# Patient Record
Sex: FEMALE | Race: OTHER
[De-identification: names, ages, dates, MRNs, and addresses within clinical notes are randomized per-mention and may not be internally consistent; named-entity substitution may affect disease eponyms.]

---

## 2017-01-03 ENCOUNTER — HOSPITAL ENCOUNTER (OUTPATIENT)
Dept: HOSPITAL 47 - EC | Age: 47
Setting detail: OBSERVATION
LOS: 1 days | Discharge: HOME | End: 2017-01-04
Attending: HOSPITALIST | Admitting: HOSPITALIST
Payer: COMMERCIAL

## 2017-01-03 DIAGNOSIS — R07.89: Primary | ICD-10-CM

## 2017-01-03 DIAGNOSIS — R11.10: ICD-10-CM

## 2017-01-03 DIAGNOSIS — Z82.49: ICD-10-CM

## 2017-01-03 DIAGNOSIS — E87.6: ICD-10-CM

## 2017-01-03 DIAGNOSIS — Z80.0: ICD-10-CM

## 2017-01-03 DIAGNOSIS — I10: ICD-10-CM

## 2017-01-03 DIAGNOSIS — F41.9: ICD-10-CM

## 2017-01-03 DIAGNOSIS — R61: ICD-10-CM

## 2017-01-03 LAB
ALP SERPL-CCNC: 61 U/L (ref 38–126)
ALT SERPL-CCNC: 24 U/L (ref 9–52)
ANION GAP SERPL CALC-SCNC: 14 MMOL/L
APTT BLD: 22.5 SEC (ref 22–30)
AST SERPL-CCNC: 23 U/L (ref 14–36)
BASOPHILS # BLD AUTO: 0.1 K/UL (ref 0–0.2)
BASOPHILS NFR BLD AUTO: 1 %
BUN SERPL-SCNC: 11 MG/DL (ref 7–17)
CALCIUM SPEC-MCNC: 9.4 MG/DL (ref 8.4–10.2)
CH: 31.8
CHCM: 34.1
CHLORIDE SERPL-SCNC: 109 MMOL/L (ref 98–107)
CK SERPL-CCNC: 45 U/L (ref 30–135)
CK SERPL-CCNC: 46 U/L (ref 30–135)
CK SERPL-CCNC: 57 U/L (ref 30–135)
CO2 SERPL-SCNC: 17 MMOL/L (ref 22–30)
EOSINOPHIL # BLD AUTO: 0.1 K/UL (ref 0–0.7)
EOSINOPHIL NFR BLD AUTO: 1 %
ERYTHROCYTE [DISTWIDTH] IN BLOOD BY AUTOMATED COUNT: 4.51 M/UL (ref 3.8–5.4)
ERYTHROCYTE [DISTWIDTH] IN BLOOD: 13.1 % (ref 11.5–15.5)
GLUCOSE SERPL-MCNC: 115 MG/DL (ref 74–99)
HCT VFR BLD AUTO: 42.2 % (ref 34–46)
HDW: 2.39
HGB BLD-MCNC: 13.8 GM/DL (ref 11.4–16)
INR PPP: 1 (ref ?–1.1)
LUC NFR BLD AUTO: 2 %
LYMPHOCYTES # SPEC AUTO: 3.3 K/UL (ref 1–4.8)
LYMPHOCYTES NFR SPEC AUTO: 41 %
MAGNESIUM SPEC-SCNC: 1.8 MG/DL (ref 1.6–2.3)
MCH RBC QN AUTO: 30.7 PG (ref 25–35)
MCHC RBC AUTO-ENTMCNC: 32.8 G/DL (ref 31–37)
MCV RBC AUTO: 93.6 FL (ref 80–100)
MONOCYTES # BLD AUTO: 0.4 K/UL (ref 0–1)
MONOCYTES NFR BLD AUTO: 5 %
NEUTROPHILS # BLD AUTO: 4 K/UL (ref 1.3–7.7)
NEUTROPHILS NFR BLD AUTO: 50 %
NON-AFRICAN AMERICAN GFR(MDRD): >60
POTASSIUM SERPL-SCNC: 3.2 MMOL/L (ref 3.5–5.1)
PROT SERPL-MCNC: 7.3 G/DL (ref 6.3–8.2)
PT BLD: 9.8 SEC (ref 9–12)
SODIUM SERPL-SCNC: 140 MMOL/L (ref 137–145)
TROPONIN I SERPL-MCNC: <0.012 NG/ML (ref 0–0.03)
WBC # BLD AUTO: 0.19 10*3/UL
WBC # BLD AUTO: 8 K/UL (ref 3.8–10.6)
WBC (PEROX): 8.57

## 2017-01-03 PROCEDURE — 85610 PROTHROMBIN TIME: CPT

## 2017-01-03 PROCEDURE — 93005 ELECTROCARDIOGRAM TRACING: CPT

## 2017-01-03 PROCEDURE — 96366 THER/PROPH/DIAG IV INF ADDON: CPT

## 2017-01-03 PROCEDURE — 36415 COLL VENOUS BLD VENIPUNCTURE: CPT

## 2017-01-03 PROCEDURE — 83880 ASSAY OF NATRIURETIC PEPTIDE: CPT

## 2017-01-03 PROCEDURE — 93350 STRESS TTE ONLY: CPT

## 2017-01-03 PROCEDURE — 71020: CPT

## 2017-01-03 PROCEDURE — 83735 ASSAY OF MAGNESIUM: CPT

## 2017-01-03 PROCEDURE — 83690 ASSAY OF LIPASE: CPT

## 2017-01-03 PROCEDURE — 96365 THER/PROPH/DIAG IV INF INIT: CPT

## 2017-01-03 PROCEDURE — 93017 CV STRESS TEST TRACING ONLY: CPT

## 2017-01-03 PROCEDURE — 96375 TX/PRO/DX INJ NEW DRUG ADDON: CPT

## 2017-01-03 PROCEDURE — 84484 ASSAY OF TROPONIN QUANT: CPT

## 2017-01-03 PROCEDURE — 85379 FIBRIN DEGRADATION QUANT: CPT

## 2017-01-03 PROCEDURE — 96376 TX/PRO/DX INJ SAME DRUG ADON: CPT

## 2017-01-03 PROCEDURE — 85025 COMPLETE CBC W/AUTO DIFF WBC: CPT

## 2017-01-03 PROCEDURE — 80053 COMPREHEN METABOLIC PANEL: CPT

## 2017-01-03 PROCEDURE — 93306 TTE W/DOPPLER COMPLETE: CPT

## 2017-01-03 PROCEDURE — 82550 ASSAY OF CK (CPK): CPT

## 2017-01-03 PROCEDURE — 85730 THROMBOPLASTIN TIME PARTIAL: CPT

## 2017-01-03 PROCEDURE — 82553 CREATINE MB FRACTION: CPT

## 2017-01-03 PROCEDURE — 71275 CT ANGIOGRAPHY CHEST: CPT

## 2017-01-03 PROCEDURE — 80061 LIPID PANEL: CPT

## 2017-01-03 PROCEDURE — 99291 CRITICAL CARE FIRST HOUR: CPT

## 2017-01-03 RX ADMIN — NITROGLYCERIN PRN MG: 0.4 TABLET SUBLINGUAL at 11:03

## 2017-01-03 RX ADMIN — METOPROLOL TARTRATE SCH MG: 25 TABLET, FILM COATED ORAL at 17:49

## 2017-01-03 RX ADMIN — ATORVASTATIN CALCIUM SCH MG: 80 TABLET, FILM COATED ORAL at 17:49

## 2017-01-03 RX ADMIN — METOPROLOL TARTRATE SCH MG: 25 TABLET, FILM COATED ORAL at 20:51

## 2017-01-03 RX ADMIN — NITROGLYCERIN PRN MG: 0.4 TABLET SUBLINGUAL at 07:31

## 2017-01-03 NOTE — ED
General Adult HPI





- General


Chief complaint: Chest Pain


Stated complaint: JAY


Time Seen by Provider: 01/03/17 03:45


Source: patient, RN notes reviewed, old records reviewed


Mode of arrival: wheelchair


Limitations: no limitations





- History of Present Illness


Initial comments: 





This is a 38-year-old female the ER for evaluation of pain.  Patient coming in 

for evaluation of chest pain chest pain left H her back and up into her jaw and 

jaw and down her arm.  She also states pain comes a little bit on the left side 

of her chest.  Patient has no history of similar chest pain, nonsmoker no high 

blood pressure no high cholesterol and no diabetes.  Patient is noted to have 

no significant shortness of breath.  No travel history no sick contacts no 

cough or congestion.  Patient states her mother did not have a heart attack at 

a young age





- Related Data


 Home Medications











 Medication  Instructions  Recorded  Confirmed


 


No Known Home Medications [No  01/03/17 01/03/17





Known Home Medications]   











 Allergies











Allergy/AdvReac Type Severity Reaction Status Date / Time


 


No Known Allergies Allergy   Verified 01/03/17 03:40














Review of Systems


ROS Statement: 


Those systems with pertinent positive or pertinent negative responses have been 

documented in the HPI.





ROS Other: All systems not noted in ROS Statement are negative.





Past Medical History


Past Medical History: No Reported History


History of Any Multi-Drug Resistant Organisms: None Reported


Additional Past Surgical History / Comment(s): tubal pregnancy


Past Psychological History: No Psychological Hx Reported


Smoking Status: Never smoker


Past Alcohol Use History: Occasional


Past Drug Use History: None Reported





General Exam


Limitations: no limitations


General appearance: alert, in no apparent distress, anxious


Head exam: Present: atraumatic, normocephalic, normal inspection


Eye exam: Present: normal appearance, PERRL, EOMI.  Absent: scleral icterus, 

conjunctival injection, periorbital swelling


ENT exam: Present: normal exam, mucous membranes moist


Neck exam: Present: normal inspection.  Absent: tenderness, meningismus, 

lymphadenopathy


Respiratory exam: Present: normal lung sounds bilaterally.  Absent: respiratory 

distress, wheezes, rales, rhonchi, stridor


Cardiovascular Exam: Present: regular rate, normal rhythm, normal heart sounds.

  Absent: systolic murmur, diastolic murmur, rubs, gallop, clicks


GI/Abdominal exam: Present: soft, normal bowel sounds.  Absent: distended, 

tenderness, guarding, rebound, rigid


Extremities exam: Present: normal inspection, full ROM, normal capillary 

refill.  Absent: tenderness, pedal edema, joint swelling, calf tenderness


Back exam: Present: normal inspection


Neurological exam: Present: alert, oriented X3, CN II-XII intact


Psychiatric exam: Present: normal affect, normal mood


Skin exam: Present: warm, dry, intact, normal color.  Absent: rash





Course


 Vital Signs











  01/03/17 01/03/17





  03:36 04:15


 


Temperature 98.6 F 


 


Pulse Rate 116 H 69


 


Respiratory 20 14





Rate  


 


Blood Pressure 189/117 170/104


 


O2 Sat by Pulse 99 98





Oximetry  














- Reevaluation(s)


Reevaluation #1: 





01/03/17 05:54


Patient still remains with left-sided chest pain diaphoresis





EKG Findings





- EKG Comments:


EKG Findings:: EKG shows normal sinus rhythm rate 93, , QRS 80, 





Medical Decision Making





- Medical Decision Making





Brenda female here for evaluation of chest pain.  Patient has positive family 

history for heart disease.  Initial EKG and troponin are negative palpation be 

admitted for anticoagulation and cardiac evaluation.  Sterile troponins and 

telemetry.  CT was negative for PE





- Lab Data


Result diagrams: 


 01/03/17 03:50





 01/03/17 03:50


 Lab Results











  01/03/17 01/03/17 01/03/17 Range/Units





  03:50 03:50 03:50 


 


WBC   8.0   (3.8-10.6)  k/uL


 


RBC   4.51   (3.80-5.40)  m/uL


 


Hgb   13.8   (11.4-16.0)  gm/dL


 


Hct   42.2   (34.0-46.0)  %


 


MCV   93.6   (80.0-100.0)  fL


 


MCH   30.7   (25.0-35.0)  pg


 


MCHC   32.8   (31.0-37.0)  g/dL


 


RDW   13.1   (11.5-15.5)  %


 


Plt Count   163   (150-450)  k/uL


 


Neutrophils %   50   %


 


Lymphocytes %   41   %


 


Monocytes %   5   %


 


Eosinophils %   1   %


 


Basophils %   1   %


 


Neutrophils #   4.0   (1.3-7.7)  k/uL


 


Lymphocytes #   3.3   (1.0-4.8)  k/uL


 


Monocytes #   0.4   (0-1.0)  k/uL


 


Eosinophils #   0.1   (0-0.7)  k/uL


 


Basophils #   0.1   (0-0.2)  k/uL


 


PT     (9.0-12.0)  sec


 


INR     (<1.1)  


 


APTT     (22.0-30.0)  sec


 


D-Dimer     (<0.60)  mg/L FEU


 


Sodium    140  (137-145)  mmol/L


 


Potassium    3.2 L  (3.5-5.1)  mmol/L


 


Chloride    109 H  ()  mmol/L


 


Carbon Dioxide    17 L  (22-30)  mmol/L


 


Anion Gap    14  mmol/L


 


BUN    11  (7-17)  mg/dL


 


Creatinine    0.70  (0.52-1.04)  mg/dL


 


Est GFR (MDRD) Af Amer    >60  (>60 ml/min/1.73 sqM)  


 


Est GFR (MDRD) Non-Af    >60  (>60 ml/min/1.73 sqM)  


 


Glucose    115 H  (74-99)  mg/dL


 


Calcium    9.4  (8.4-10.2)  mg/dL


 


Magnesium    1.8  (1.6-2.3)  mg/dL


 


Total Bilirubin    0.9  (0.2-1.3)  mg/dL


 


AST    23  (14-36)  U/L


 


ALT    24  (9-52)  U/L


 


Alkaline Phosphatase    61  ()  U/L


 


Total Creatine Kinase  57    ()  U/L


 


CK-MB (CK-2)  0.3    (0.0-2.4)  ng/mL


 


CK-MB (CK-2) Rel Index  0.5    


 


Troponin I  <0.012    (0.000-0.034)  ng/mL


 


NT-Pro-B Natriuret Pep     pg/mL


 


Total Protein    7.3  (6.3-8.2)  g/dL


 


Albumin    4.3  (3.5-5.0)  g/dL


 


Lipase    182  ()  U/L














  01/03/17 01/03/17 Range/Units





  03:50 03:50 


 


WBC    (3.8-10.6)  k/uL


 


RBC    (3.80-5.40)  m/uL


 


Hgb    (11.4-16.0)  gm/dL


 


Hct    (34.0-46.0)  %


 


MCV    (80.0-100.0)  fL


 


MCH    (25.0-35.0)  pg


 


MCHC    (31.0-37.0)  g/dL


 


RDW    (11.5-15.5)  %


 


Plt Count    (150-450)  k/uL


 


Neutrophils %    %


 


Lymphocytes %    %


 


Monocytes %    %


 


Eosinophils %    %


 


Basophils %    %


 


Neutrophils #    (1.3-7.7)  k/uL


 


Lymphocytes #    (1.0-4.8)  k/uL


 


Monocytes #    (0-1.0)  k/uL


 


Eosinophils #    (0-0.7)  k/uL


 


Basophils #    (0-0.2)  k/uL


 


PT  9.8   (9.0-12.0)  sec


 


INR  1.0   (<1.1)  


 


APTT  22.5   (22.0-30.0)  sec


 


D-Dimer  0.68 H   (<0.60)  mg/L FEU


 


Sodium    (137-145)  mmol/L


 


Potassium    (3.5-5.1)  mmol/L


 


Chloride    ()  mmol/L


 


Carbon Dioxide    (22-30)  mmol/L


 


Anion Gap    mmol/L


 


BUN    (7-17)  mg/dL


 


Creatinine    (0.52-1.04)  mg/dL


 


Est GFR (MDRD) Af Amer    (>60 ml/min/1.73 sqM)  


 


Est GFR (MDRD) Non-Af    (>60 ml/min/1.73 sqM)  


 


Glucose    (74-99)  mg/dL


 


Calcium    (8.4-10.2)  mg/dL


 


Magnesium    (1.6-2.3)  mg/dL


 


Total Bilirubin    (0.2-1.3)  mg/dL


 


AST    (14-36)  U/L


 


ALT    (9-52)  U/L


 


Alkaline Phosphatase    ()  U/L


 


Total Creatine Kinase    ()  U/L


 


CK-MB (CK-2)    (0.0-2.4)  ng/mL


 


CK-MB (CK-2) Rel Index    


 


Troponin I    (0.000-0.034)  ng/mL


 


NT-Pro-B Natriuret Pep   117  pg/mL


 


Total Protein    (6.3-8.2)  g/dL


 


Albumin    (3.5-5.0)  g/dL


 


Lipase    ()  U/L














- Radiology Data


Radiology results: report reviewed (Chest x-ray 2 view negative for acute 

disease, CTA negative for PE), image reviewed





Critical Care Time


Critical Care Time: Yes


Total Critical Care Time: 31





Disposition


Clinical Impression: 


 Chest pain





Disposition: ADMITTED AS IP TO THIS [facility]


Condition: Good


Instructions:  Chest Pain (ED)


Referrals: 


Candy Squires MD [Primary Care Provider] - 1-2 days

## 2017-01-03 NOTE — P.CRDCN
History of Present Illness


Consult date: 17


History of present illness: 


This is a 46-year-old female with no significant past medical history woke up 

around 3:00 last night with complaints of pain in the left shoulder and also in 

the back radiating down the arm and also to the upper chest area.  The pain was 

about 7 on scale of 1-10.  This was associated mild nausea and sweating.  He 

doesn't appear to be respirophasic and did not change with the movements of the 

shoulder or arm in intensity.  Patient was brought to the hospital and at about 

8:00 in the morning patient was treated with sublingual nitro with relief of 

pain.  Patient was also found to be very hypertensive.  Her first EKG showed 

mild ST-T abnormalities in the inferolateral leads.  Second EKG however showed 

normal findings.  The 1 st troponin was within normal limits and at the time of 

my examination, patient claims the pain is coming back and rated as 4 on a 

scale of 1-10.  We're in the process of treating her with sublingual 

nitroglycerin.  May get a repeat EKG.  We'll get bedside echocardiogram also.  

In the second troponin is normal and echocardiogram doesn't show any segmental 

wall motion defects, we may consider doing a nuclear stress test or stress echo 

for further evaluation.  However if the troponin is abnormal or the echo 

cardiogram showed any segmental wall motion defects, patient may need cardiac 

catheterization for definitive diagnosis.  Patient was also slightly 

hypokalemic and has received supplements .








Review of Systems


As per HPI





Past Medical History


Past Medical History: No Reported History


Additional Past Medical History / Comment(s): Possibly chron's dx, hemorrhoids, 

HTN with pregnancy.


History of Any Multi-Drug Resistant Organisms: None Reported


Past Surgical History: Tubal Ligation


Additional Past Surgical History / Comment(s): R tubal pregnancy with 

oophorectomy, anal abscess/fistula


Past Anesthesia/Blood Transfusion Reactions: Postoperative Nausea & Vomiting (

PONV)


Past Psychological History: No Psychological Hx Reported


Additional Psychological History / Comment(s): Pt resides with her spouse and 4 

children, one who is under the age of 18yrs.  She is independent.


Smoking Status: Never smoker


Past Alcohol Use History: Occasional


Past Drug Use History: None Reported





- Past Family History


  ** Father


Family Medical History: Cancer


Additional Family Medical History / Comment(s): Father had colon cancer with 

mets.  He  of this at the age of 79yrs.





  ** Mother


Family Medical History: Myocardial Infarction (MI)


Additional Family Medical History / Comment(s): Mother had a MI at the age of 

84 yrs.  She is now 91yrs old.





Medications and Allergies


 Home Medications











 Medication  Instructions  Recorded  Confirmed  Type


 


No Known Home Medications [No  17 History





Known Home Medications]    











 Allergies











Allergy/AdvReac Type Severity Reaction Status Date / Time


 


No Known Allergies Allergy   Verified 17 07:37














Physical Exam


Vitals: 


 Vital Signs











  Pulse Resp BP Pulse Ox


 


 17 09:19   18  


 


 17 08:06  64  18  160/91  98


 


 17 06:13  70  16  169/69  98


 


 17 06:05  74  18  184/108  99








 Intake and Output











 17





 22:59 06:59 14:59


 


Other:   


 


  # Voids   1














GENERAL EXAM: Patient is alert and oriented and doesn't appear to be in any 

acute distress


HEENT: Normocephalic. Normal reaction of pupils, equal size, normal range of 

extraocular motion. No erythema or exudates in the throat.


NECK: No masses, no nuchal rigidity.


CHEST: No chest wall deformity.


LUNGS: Equal air entry with no crackles or wheeze.


HEART: S1 and S2 normal with no audible mumurs or gallops. Regular rhythm, 

femorals equal on both sides..


ABDOMEN: No hepatosplenomegaly, normal bowel sounds, no guarding or rigidity.


SKIN: No rashes


CENTRAL NERVOUS SYSTEM: No focal deficits.


EXTREMITIES: No cyanosis, clubbing or edema.





Results





 17 03:50





 17 03:50


 Current Medications











Generic Name Dose Route Start Last Admin





  Trade Name Freq  PRN Reason Stop Dose Admin


 


Aspirin  325 mg  17 09:00  





  Aspirin  PO   





  DAILY [facility]   


 


Atorvastatin Calcium  80 mg  17 09:00  





  Lipitor  PO   





  DAILY [facility]   


 


Heparin Sodium (Porcine)  0 unit  17 05:51  





  Heparin  IV   





  PER PROTOCOL PRN   





  Low PTT   





  Protocol   


 


Sodium Chloride  1,000 mls @ 100 mls/hr  17 03:56  17 04:12





  Saline 0.9%  IV  17 13:55  100 mls/hr





  .Q10H STA   Administration


 


Heparin Sodium/Dextrose 25,000  500 mls @ 14.15 mls/hr  17 06:00   07:20





  unit/ IV Solution  IV   12 units/kg/hr





  .Q24H BROCK   14.15 mls/hr





  Protocol   Administration





  12 UNITS/KG/HR   


 


Sodium Chloride  1,000 mls @ 100 mls/hr  17 06:00  





  Saline 0.9%  IV   





  .Q10H BROCK   


 


Lisinopril  5 mg  17 09:00  





  Zestril  PO   





  DAILY [facility]   


 


Metoprolol Tartrate  25 mg  17 09:00  





  Lopressor  PO   





  BID BROCK   


 


Miscellaneous Information  1 each  17 04:49  





  Rx Info: Iv Contrast Was Given  MISCELLANE  17 04:49  





  DAILY PRN   





  Per Protocol   


 


Morphine Sulfate  4 mg  17 05:51  





  Morphine Sulfate (Inj)  IV   





  Q4HR PRN   





  Chest Pain   


 


Nitroglycerin  0.4 mg  17 05:51  17 11:03





  Nitrostat  SUBLINGUAL   0.4 mg





  Q5M PRN   Administration





  Chest Pain   








 Intake and Output











 17





 22:59 06:59 14:59


 


Other:   


 


  # Voids   1














EKG Interpretations (text)





First EKG showed sinus rhythm with mild ST-T abnormalities in inferolateral 

leads.  Second EKG showed normal findings





Assessment and Plan


(1) Hypokalemia


Status: Acute   





(2) Chest pain


Status: Acute   





(3) Hypertension


Status: Acute   


Plan: 


We'll treat her with nitrates.  Patient also needs beta blockers and possibly 

ACE inhibitor for control of her blood pressure and will get bedside 

echocardiogram.  Further recommendations depend upon clinical course.  If the 

troponins become positive or if echo cardiac rhythm shows any segmental wall 

motions, patient may need a cardiac cath.  If not stress test to be considered

## 2017-01-03 NOTE — CT
EXAMINATION TYPE: CT angio chest

 

DATE OF EXAM: 1/3/2017 5:10 AM

 

COMPARISON: NONE

 

HISTORY: Left sided chest pain, elevated d-dimer, R/O PE

 

CT DLP: 148.40 mGycm

Automated exposure control for dose reduction was used.

 

CONTRAST: 

CTA scan of the thorax is performed with IV Contrast, patient injected with 65ml injected 35ml wasted
 mL of Omnipaque 350, pulmonary embolism protocol.  MIP images are created and reviewed.  3D reconstr
ucted images are created on an independent workstation and reviewed.

 

FINDINGS:

 

LUNGS: The lungs are grossly clear, there is no concerning parenchymal mass or nodule identified.   T
here is no pleural effusion or pneumothorax seen.  The tracheobronchial tree is patent.

 

MEDIASTINUM: There is satisfactory enhancement of the pulmonary artery and its branches, there is no 
CT evidence for pulmonary embolism.  There are no greater than 1 cm hilar or mediastinal lymph nodes.
   No pericardial effusion is seen. Ascending aorta measures 3.2 cm in greatest AP diameter without s
ignificant aneurysm.

 

 

OTHER:  No additional significant abnormality is seen.

 

 

 

IMPRESSION: 

NO EVIDENCE OF ACUTE PULMONARY EMBOLISM.

NO ACTIVE LUNG INFILTRATES.

## 2017-01-03 NOTE — HP
DATE OF ADMISSION:   



CHIEF COMPLAINT: Chest pain. 



HISTORY OF PRESENT ILLNESS: Ms. Kearney is a 46-year-old female without 

significant past medical history who came to the hospital with 

complaints of left-sided retrosternal chest pain with radiation to the 

left shoulder and down the left arm. Patient states it felt like some 

heaviness in the chest and associated with mild nausea and sweating. 

Patient did wake up from the sleep secondary due to pain. Otherwise 

the patient had EKG showing nonspecific ST-T wave changes and troponin 

is negative. Patient did have episode of chest pain again in the ER. 

Relief of symptoms with nitroglycerin. Patient also had episode of 

vomiting. Patient denied any recent illnesses. No (   ). No sick 

contacts at home. Denied any family history of coronary artery disease 

except mother had MI at age of 80 years. Currently being followed by 

cardiology.   



REVIEW OF SYSTEMS: 

CONSTITUTIONAL: No fever, no chills. 

RESPIRATORY: No cough or sputum production. 

CARDIOVASCULAR: No chest pain or short of breath. 

ABDOMEN: No nausea, vomiting, or abdominal pain. 

GENITOURINARY: Negative. 

ENDOCRINE: Negative. 

PSYCHIATRY: Negative. 

SKIN: Negative. 

All other fourteen-point review of systems negative except as above. 



Past medical history includes possible Crohn's disease, hemorrhoids, 

hypertension with pregnancy.  



PAST SURGICAL HISTORY: Tubal ligation, oophorectomy, (    ) fistula. 



SOCIAL HISTORY: Patient never a smoker. Occasional alcohol. Denied any 

drugs or IVDU.  



FAMILY HISTORY: Father had colon cancer with mets. He  with this 

at age of 79 years. Mother had MI at the age of 84-years.  



No home medications. 



ALLERGIES: No known drug allergies. 



PHYSICAL EXAMINATION: A 46-year-old female lying in bed comfortably. 

Awake, alert, oriented, x3, appears to be in no apparent distress.  

VITALS: Blood pressure is 116/60, pulse is 70, respiratory rate 16, 

temperature afebrile, pulse ox is 98% on room air.  

HEENT: Atraumatic, normocephalic. Neck is supple. No JVD. 

CVS: S1, S2 heard. No murmurs, no gallop, no rub. 

LUNGS: Bilateral air entry is present. No wheezing. No crackles. 

Nonlabored breathing.  

ABDOMEN: Soft, nontender, bowel sounds present. 

CNS:  Alert and oriented x3. No focal neurological deficits. Cranial 

nerves grossly intact.  

EXTREMITIES: No edema. Pulses palpable bilaterally. No clubbing or 

cyanosis.  

PSYCHIATRIC: Cooperative. 



LABORATORY DATA: WBC 8.0, hemoglobin 13.8, platelets 1683, INR 1.0, 

d-dimer 0.68. Sodium 140, potassium 3.2, chloride 109, bicarb is 17, 

anion gap is 14. BUN 11, creatinine 0.7. Troponin x3 negative.  Lipase 

is not elevated at 182. Chest x-ray: Nonacute cardiopulmonary process. 

EKG: Normal sinus rhythm. CT angiogram of the chest, no evidence of 

pulmonary embolism.  



IMPRESSION: 

1. Chest pain/angina, ruled out acute coronary syndrome. Initial 

electrocardiograms and troponins are negative. Cardiology is planning 

for 2-D echocardiogram and further recommendations based on the 

clinical report and continue with telemetry monitoring.  

2. Hypokalemia with potassium of 3.1. 

3. Uncontrolled blood pressure. No history of hypertension. Patient 

does not take any antihypertensive medication at home.  

4. Elevated D-dimer with CT angiogram negative for any pulmonary 

embolism.  



DISCUSSION AND PLAN: We will continue the heparin. 2-D echo was 

ordered. Cardiology has been consulted.  Continue with serial EKG and 

troponins and follow up closely. Further recommendations based on 

clinical course. Anticipate discharge in the next 24 hours.

## 2017-01-03 NOTE — XR
EXAMINATION TYPE: XR chest 2V

 

DATE OF EXAM: 1/3/2017 4:42 AM

 

COMPARISON: NONE

 

HISTORY: Left-sided chest pain.

 

TECHNIQUE:  Frontal and lateral views of the chest are obtained.

 

FINDINGS:  There is no focal air space opacity, pleural effusion, or pneumothorax seen.  The cardiac 
silhouette size is within normal limits.   The osseous structures are intact.

 

IMPRESSION:  No acute cardiopulmonary process.

## 2017-01-04 VITALS — HEART RATE: 60 BPM

## 2017-01-04 VITALS — DIASTOLIC BLOOD PRESSURE: 91 MMHG | TEMPERATURE: 97.5 F | SYSTOLIC BLOOD PRESSURE: 133 MMHG

## 2017-01-04 VITALS — RESPIRATION RATE: 16 BRPM

## 2017-01-04 LAB
BASOPHILS # BLD AUTO: 0 K/UL (ref 0–0.2)
BASOPHILS NFR BLD AUTO: 0 %
CH: 31.6
CHCM: 33.5
CHOLEST SERPL-MCNC: 185 MG/DL (ref ?–200)
EOSINOPHIL # BLD AUTO: 0.1 K/UL (ref 0–0.7)
EOSINOPHIL NFR BLD AUTO: 1 %
ERYTHROCYTE [DISTWIDTH] IN BLOOD BY AUTOMATED COUNT: 4.15 M/UL (ref 3.8–5.4)
ERYTHROCYTE [DISTWIDTH] IN BLOOD: 13.1 % (ref 11.5–15.5)
HCT VFR BLD AUTO: 39.3 % (ref 34–46)
HDLC SERPL-MCNC: 72 MG/DL (ref 40–60)
HDW: 2.35
HGB BLD-MCNC: 12.8 GM/DL (ref 11.4–16)
LUC NFR BLD AUTO: 2 %
LYMPHOCYTES # SPEC AUTO: 2.3 K/UL (ref 1–4.8)
LYMPHOCYTES NFR SPEC AUTO: 35 %
MCH RBC QN AUTO: 30.8 PG (ref 25–35)
MCHC RBC AUTO-ENTMCNC: 32.4 G/DL (ref 31–37)
MCV RBC AUTO: 94.9 FL (ref 80–100)
MONOCYTES # BLD AUTO: 0.4 K/UL (ref 0–1)
MONOCYTES NFR BLD AUTO: 6 %
NEUTROPHILS # BLD AUTO: 3.5 K/UL (ref 1.3–7.7)
NEUTROPHILS NFR BLD AUTO: 55 %
TRIGL SERPL-MCNC: 164 MG/DL (ref ?–150)
WBC # BLD AUTO: 0.13 10*3/UL
WBC # BLD AUTO: 6.5 K/UL (ref 3.8–10.6)
WBC (PEROX): 6.5

## 2017-01-04 RX ADMIN — ATORVASTATIN CALCIUM SCH MG: 80 TABLET, FILM COATED ORAL at 13:36

## 2017-01-04 RX ADMIN — METOPROLOL TARTRATE SCH MG: 25 TABLET, FILM COATED ORAL at 13:36

## 2017-01-04 NOTE — ECHOF
Referral Reason:heart function



MEASUREMENTS

--------

HEIGHT: 165.1 cm

WEIGHT: 59.0 kg

BP: 160/91

IVSd:   0.7 cm     (0.6 - 1.1)

LVIDd:   3.5 cm     (3.9 - 5.3)

LVPWd:   1.1 cm     (0.6 - 1.1)

IVSs:   1.0 cm

LVIDs:   2.7 cm

LVPWs:   0.9 cm

LA Diam:   2.8 cm     (2.7 - 3.8)

LAESV Index (A-L):   15.33 ml/m

Ao Diam:   2.4 cm     (2.0 - 3.7)

AV Cusp:   1.6 cm     (1.5 - 2.6)

LA Diam:   2.5 cm     (2.7 - 3.8)

MV EXCURSION:   14.382 mm     (> 18.000)

MV EF SLOPE:   68 mm/s     (70 - 150)

EPSS:   0.2 cm

MV E Benson:   1.00 m/s

MV DecT:   209 ms

MV A Benson:   0.66 m/s

MV E/A Ratio:   1.52 

RAP:   5.00 mmHg

RVSP:   26.68 mmHg







FINDINGS

--------

Sinus rhythm.

This was a technically good study.

LV size, wall thickness and systolic function are 

normal, with an EF greater than 55%.

The right ventricle is normal in size.

Normal LA  size by volume 22+/-6 ml/m2.

The right atrial size is normal.

There is mild aortic valve sclerosis.   There is no 

evidence of aortic regurgitation.

Mild mitral annular calcification present.   Mild 

mitral regurgitation is present.

Mild tricuspid regurgitation present.   There is no 

evidence of pulmonary hypertension.   The right 

ventricular systolic pressure, as measured by Doppler, 

is 26.68mmHg.

There is no pulmonic regurgitation present.

The aortic root size is normal.

There is no pericardial effusion.



CONCLUSIONS

--------

1. LV size, wall thickness and systolic function are 

normal, with an EF greater than 55%.

2. There is mild aortic valve sclerosis.

3. Mild mitral annular calcification present.

4. Mild mitral regurgitation is present.

5. Mild tricuspid regurgitation present.

6. There is no evidence of pulmonary hypertension.

7. The right ventricular systolic pressure, as measured by 

Doppler, is 26.68mmHg.





SONOGRAPHER: Piedad Patel RDCS

## 2017-01-04 NOTE — P.DS
Providers


Date of admission: 


01/03/17 05:53





Expected date of discharge: 01/04/17


Attending physician: 


Anisa Agarwal





Primary care physician: 


Candy Squires





[facility] Course: 


46-year-old female is admitted to the hospital with chest pain that is 

midsternal in nature nonradiating that started while patient was resting.  

Patient has had intermittent episodes a few days prior to admission.  Patient 

does not have any previous history of CAD.  She states that she has few kids 

and she is always stressed out at home.  And attributes some of her symptoms to 

her stress.





In the ER patient had a EKG was nonrevealing any ST-T wave changes.  However 

with atypical symptoms patient underwent a stress echocardiogram H did not 

reveal any abnormalities.  Patient was able to perform over 6 minutes and 30 

seconds on the modified Cullen protocol.





Physical exam





Abdomen soft nontender no organomegaly





Heart S1 and S2 heart regular rate and rhythm no murmurs appreciated





Lungs good air entry clear to auscultation





Neuro no focal motor or sensory deficits noted.  Cranial nerves II-12 intact





General in no acute distress alert oriented 3





Discharge diagnosis stress test was negative and hence patient will be 

discharged home





#1 atypical chest pain , ACS was ruled out





#2 new diagnosis of hypertension patient will be started on lisinopril 10 mg





#3 underlying anxiety patient is recommended to follow up with Dr. Candy Squires.





Patient Condition at Discharge: Good





Plan - Discharge Summary


New Discharge Prescriptions: 


Lisinopril [Prinivil] 10 mg PO DAILY #30 tab


Discharge Medication List





Lisinopril [Prinivil] 10 mg PO DAILY #30 tab 01/04/17 [Rx]








Follow up Appointment(s)/Referral(s): 


Candy Squires MD [Primary Care Provider] - 1-2 days


Patient Instructions/Handouts:  Chest Pain (ED)


Discharge Disposition: HOME SELF-CARE

## 2017-01-04 NOTE — ECHOS
DATE OF SERVICE:  01/04/2017



AGE:   46Y        SEX:  F        HT:  66"     WT:  127 lbs.           

Protocol Cullen: X  Others:  Stress Echo  Stage:  2  Dur. of Exercise: 

5:00 



*Heart Rate         Blood Pressure                     

*Rest:  79                    Rest:  124/67                           

*                              

*Max. Achieved:     161  Maximum BP:  176/111     

       85% PMHR:  146                    

          100% PMHR:  172          



*METS:  6.7    





INDICATIONS:  Chest pain. 



MEDICATIONS: -



CLINICAL INFORMATION:   History of chest pain, palpitations and family 

history of coronary artery disease.  



Resting ECG shows sinus rhythm, rate of 79 beats per minute, NM 

interval of  0.16, QRS 0.08, normal ST-T waves. Utilizing a standard 

Cullen protocol, a symptom-limited treadmill test was performed. 

Patient exercised for total of 5 minutes, attained a peak heart rate 

of 161 beats per minute which is approximately 95% predicted maximum 

heart rate without any chest pain or pressure or ST segment deviations 

indicative of ischemia. Baseline images show normal thickening and 

contractility.  The posterior segment shows improved contractility and 

thickening in all segments, consistent with normal study.  



IMPRESSION: 

1. Normal stress echocardiogram. 

2. Patient has average level of cardiopulmonary fitness as indicated 

by O2 max and METs.

## 2018-05-04 ENCOUNTER — HOSPITAL ENCOUNTER (OUTPATIENT)
Dept: HOSPITAL 47 - RADCTMAIN | Age: 48
Discharge: HOME | End: 2018-05-04
Payer: COMMERCIAL

## 2018-05-04 DIAGNOSIS — K52.9: Primary | ICD-10-CM

## 2018-05-04 PROCEDURE — 74177 CT ABD & PELVIS W/CONTRAST: CPT

## 2018-05-04 NOTE — CT
EXAMINATION TYPE: CT abdomen pelvis w con

 

DATE OF EXAM: 5/4/2018

 

HISTORY: Patient complains of chronic diarrhea.

 

CT DLP: 349mGycm  Automated Exposure Control for Dose Reduction was Utilized.

 

CONTRAST: 

CT scan of the abdomen and pelvis is performed with oral and with IV Contrast, patient injected with 
100 mL of Isovue 300.

 

COMPARISON: CT abdomen and pelvis August 15, 2011.

 

FINDINGS:

 

LUNG BASES: No significant abnormality is appreciated.

 

LIVER/GB: Contracted gallbladder is seen.

 

PANCREAS: No significant abnormality is seen.

 

SPLEEN: No significant abnormality is seen.

 

ADRENALS: No significant abnormality is seen.

 

KIDNEYS: No significant abnormality is seen.

 

BOWEL: Oral contrast extends to proximal ileal level. There is no suspicious small or large bowel dil
atation identified

 

UTERUS/ADNEXA: No gross abnormality seen.

 

LYMPH NODES: No greater than 1cm abdominal or pelvic lymph nodes are appreciated.

 

OSSEOUS STRUCTURES: There is moderate disc space narrowing L5-S1 level.

 

OTHER: No significant additional abnormality is seen.

 

IMPRESSION: No significant acute finding is seen to account for patient's clinical symptoms.

## 2025-03-12 ENCOUNTER — HOSPITAL ENCOUNTER (EMERGENCY)
Dept: HOSPITAL 47 - EC | Age: 55
Discharge: HOME | End: 2025-03-12
Payer: COMMERCIAL

## 2025-03-12 VITALS — SYSTOLIC BLOOD PRESSURE: 154 MMHG | DIASTOLIC BLOOD PRESSURE: 97 MMHG | HEART RATE: 74 BPM

## 2025-03-12 VITALS — TEMPERATURE: 97.5 F

## 2025-03-12 VITALS — RESPIRATION RATE: 16 BRPM

## 2025-03-12 DIAGNOSIS — R42: Primary | ICD-10-CM

## 2025-03-12 LAB
ALBUMIN SERPL-MCNC: 4.6 G/DL (ref 3.5–5)
ALP SERPL-CCNC: 95 U/L (ref 38–126)
ALT SERPL-CCNC: 21 U/L (ref 4–34)
ANION GAP SERPL CALC-SCNC: 14 MMOL/L
APTT BLD: 22.1 SEC (ref 22–30)
AST SERPL-CCNC: 29 U/L (ref 14–36)
BASOPHILS # BLD AUTO: 0 K/UL (ref 0–0.2)
BASOPHILS NFR BLD AUTO: 1 %
BUN SERPL-SCNC: 14 MG/DL (ref 7–17)
CALCIUM SPEC-MCNC: 9.7 MG/DL (ref 8.4–10.2)
CHLORIDE SERPL-SCNC: 104 MMOL/L (ref 98–107)
CO2 SERPL-SCNC: 20 MMOL/L (ref 22–30)
EOSINOPHIL # BLD AUTO: 0.1 K/UL (ref 0–0.7)
EOSINOPHIL NFR BLD AUTO: 1 %
ERYTHROCYTE [DISTWIDTH] IN BLOOD BY AUTOMATED COUNT: 4.61 M/UL (ref 3.8–5.4)
ERYTHROCYTE [DISTWIDTH] IN BLOOD: 13.1 % (ref 11.5–15.5)
GLUCOSE SERPL-MCNC: 103 MG/DL (ref 74–99)
HCT VFR BLD AUTO: 44.9 % (ref 34–46)
HGB BLD-MCNC: 14 GM/DL (ref 11.4–16)
INR PPP: 1 (ref ?–1.2)
LYMPHOCYTES # SPEC AUTO: 1.8 K/UL (ref 1–4.8)
LYMPHOCYTES NFR SPEC AUTO: 28 %
MAGNESIUM SPEC-SCNC: 1.8 MG/DL (ref 1.6–2.3)
MCH RBC QN AUTO: 30.4 PG (ref 25–35)
MCHC RBC AUTO-ENTMCNC: 31.2 G/DL (ref 31–37)
MCV RBC AUTO: 97.5 FL (ref 80–100)
MONOCYTES # BLD AUTO: 0.3 K/UL (ref 0–1)
MONOCYTES NFR BLD AUTO: 4 %
NEUTROPHILS # BLD AUTO: 3.9 K/UL (ref 1.3–7.7)
NEUTROPHILS NFR BLD AUTO: 63 %
PLATELET # BLD AUTO: 213 K/UL (ref 150–450)
POTASSIUM SERPL-SCNC: 3.4 MMOL/L (ref 3.5–5.1)
PROT SERPL-MCNC: 8.2 G/DL (ref 6.3–8.2)
PT BLD: 10.9 SEC (ref 10–12.5)
SODIUM SERPL-SCNC: 138 MMOL/L (ref 137–145)
WBC # BLD AUTO: 6.2 K/UL (ref 3.8–10.6)

## 2025-03-12 PROCEDURE — 70450 CT HEAD/BRAIN W/O DYE: CPT

## 2025-03-12 PROCEDURE — 85610 PROTHROMBIN TIME: CPT

## 2025-03-12 PROCEDURE — 36415 COLL VENOUS BLD VENIPUNCTURE: CPT

## 2025-03-12 PROCEDURE — 96374 THER/PROPH/DIAG INJ IV PUSH: CPT

## 2025-03-12 PROCEDURE — 93005 ELECTROCARDIOGRAM TRACING: CPT

## 2025-03-12 PROCEDURE — 85025 COMPLETE CBC W/AUTO DIFF WBC: CPT

## 2025-03-12 PROCEDURE — 83735 ASSAY OF MAGNESIUM: CPT

## 2025-03-12 PROCEDURE — 85730 THROMBOPLASTIN TIME PARTIAL: CPT

## 2025-03-12 PROCEDURE — 99285 EMERGENCY DEPT VISIT HI MDM: CPT

## 2025-03-12 PROCEDURE — 80053 COMPREHEN METABOLIC PANEL: CPT

## 2025-03-12 PROCEDURE — 84484 ASSAY OF TROPONIN QUANT: CPT

## 2025-03-12 PROCEDURE — 96375 TX/PRO/DX INJ NEW DRUG ADDON: CPT

## 2025-03-12 PROCEDURE — 71046 X-RAY EXAM CHEST 2 VIEWS: CPT

## 2025-03-12 RX ADMIN — METOCLOPRAMIDE STA MG: 5 INJECTION, SOLUTION INTRAMUSCULAR; INTRAVENOUS at 12:56

## 2025-03-12 RX ADMIN — MECLIZINE HYDROCHLORIDE STA MG: 25 TABLET ORAL at 10:58

## 2025-03-12 RX ADMIN — CEFAZOLIN ONE MLS/HR: 330 INJECTION, POWDER, FOR SOLUTION INTRAMUSCULAR; INTRAVENOUS at 10:58

## 2025-03-12 NOTE — XR
EXAMINATION TYPE: XR chest 2V

 

DATE OF EXAM: 3/12/2025 11:51 AM

 

COMPARISON: Chest radiographs from 1/3/2017

 

CLINICAL INDICATION: Female, 54 years old with history of Chest Pain;

 

TECHNIQUE: XR chest 2V Frontal and lateral views of the chest.

 

FINDINGS: 

Lungs/Pleura: There is no evidence of pleural effusion, focal consolidation, or pneumothorax.  

Pulmonary vascularity: Unremarkable.

Heart/mediastinum: Cardiomediastinal silhouette is unremarkable.

Musculoskeletal: No acute osseous pathology.

 

 

IMPRESSION: 

No acute cardiopulmonary disease/process.

 

 

 

[facility], Workstation: [id number], 3/12/2025 11:59 AM

## 2025-03-12 NOTE — ED
General Adult HPI





- General


Chief complaint: Dizziness


Stated complaint: Dizziness,Irreg BP


Time Seen by Provider: 25 10:40


Source: patient, RN notes reviewed, old records reviewed


Mode of arrival: ambulatory


Limitations: no limitations





- History of Present Illness


Initial comments: 





This is a 54-year-old female who states yesterday while laying in bed she moved 

and the whole room started to move.  Patient states ever since then she feels 

very dizzy and the room continues to seem to move.  Patient states it is much 

worse with movement of her head and she states feels better.  Patient denies any

nausea.  Patient states she has a little bit of a posterior headache.  Patient 

denies any chest pain palpitations difficulty breathing shortness of breath.  

Patient has any fever chills or cough.  Patient Nuys any swelling to the legs or

calf tenderness.  Patient denies any history of similar.  Patient states only 

medicine she takes is Prilosec





- Related Data


                                Home Medications











 Medication  Instructions  Recorded  Confirmed


 


Omeprazole 20 mg PO DAILY 25








                                  Previous Rx's











 Medication  Instructions  Recorded


 


Meclizine [Antivert] 25 mg PO TID #20 tab 25











                                    Allergies











Allergy/AdvReac Type Severity Reaction Status Date / Time


 


No Known Allergies Allergy   Verified 25 11:16














Review of Systems


ROS Statement: 


Those systems with pertinent positive or pertinent negative responses have been 

documented in the HPI.





ROS Other: All systems not noted in ROS Statement are negative.





Past Medical History


Past Medical History: No Reported History


Additional Past Medical History / Comment(s): Possibly chron's dx, hemorrhoids, 

HTN with pregnancy.


History of Any Multi-Drug Resistant Organisms: None Reported


Past Surgical History: Tubal Ligation


Additional Past Surgical History / Comment(s): R tubal pregnancy with 

oophorectomy, anal abscess/fistula


Past Anesthesia/Blood Transfusion Reactions: Postoperative Nausea & Vomiting 

(PONV)


Past Psychological History: No Psychological Hx Reported


Smoking Status: Never smoker


Past Alcohol Use History: Occasional


Past Drug Use History: None Reported





- Past Family History


  ** Father


Family Medical History: Cancer


Additional Family Medical History / Comment(s): Father had colon cancer with 

mets.  He  of this at the age of 79yrs.





  ** Mother


Family Medical History: Myocardial Infarction (MI)


Additional Family Medical History / Comment(s): Mother had a MI at the age of 84

 yrs.  She is now 91yrs old.





General Exam





- General Exam Comments


Initial Comments: 





GENERAL:


Patient is well-developed and well-nourished.  Patient is nontoxic and well-

hydrated and is in no acute distress.





ENT:


Neck is soft and supple.  No significant lymphadenopathy is noted.  Oropharynx 

is clear.  Moist mucous membranes.  Neck has full range of motion without 

eliciting any pain. 





EYES:


The sclera were anicteric and conjunctiva were pink and moist.  Extraocular 

movements were intact and pupils were equal round and reactive to light.  

Eyelids were unremarkable.





PULMONARY:


Unlabored respirations.  Good breath sounds bilaterally.  No audible rales 

rhonchi or wheezing was noted.





CARDIOVASCULAR:


There is a regular rate and rhythm without any murmurs gallops or rubs.





ABDOMEN:


Soft and nontender with normal bowel sounds.  No palpable organomegaly was 

noted.  There is no palpable pulsatile mass.





SKIN:


Skin is clear with no lesions or rashes and otherwise unremarkable.





NEUROLOGIC:


Patient is alert and oriented x3.  Cranial nerves II through XII are grossly 

intact.  Motor and sensory are also intact.  Normal speech, volume and content. 

 Symmetrical smile.  Finger-nose testing is normal bilaterally





MUSCULOSKELETAL:


Normal extremities with adequate strength and full range of motion.  No lower 

extremity swelling or edema.  No calf tenderness.





LYMPHATICS:


No significant lymphadenopathy is noted





PSYCHIATRIC:


Normal psychiatric evaluation.  


Limitations: no limitations





Course


                                   Vital Signs











  25





  10:34 10:37 11:37


 


Temperature 97.5 F L  


 


Pulse Rate 77 67 76


 


Respiratory 18 14 16





Rate   


 


Blood Pressure 213/100 199/118 177/111


 


O2 Sat by Pulse 99 100 97





Oximetry   














  25





  13:00


 


Temperature 


 


Pulse Rate 64


 


Respiratory 16





Rate 


 


Blood Pressure 166/102


 


O2 Sat by Pulse 98





Oximetry 














Medical Decision Making





- Medical Decision Making





EKG is interpreted by myself but EKG shows sinus rhythm at 72 bpm MD was 171 QRS

 is 88 QT interval 381 QTc is 405.  Patient EKG shows no ST segment elevation or

 depression.





Was pt. sent in by a medical professional or institution (, PA, NP, urgent 

care, hospital, or nursing home...) When possible be specific


@ -No


Did you speak to anyone other than the patient for history (EMS, parent, family,

 police, friend...)? What history was obtained from this source 


@ -No


Did you review nursing and triage notes (agree or disagree)? Why? 


@ -I reviewed and agree with nursing and triage notes


Were old charts reviewed (outside hosp., previous admission, EMS record, old 

EKG, old radiological studies, urgent care reports/EKG's, nursing home records)?

 Report findings 


@ -No old charts were reviewed


Differential Diagnosis? 


@ -Differential Dizziness:


Benign paroxysmal positional Vertigo, Meniere's disease, otitis media, acoustic 

neuroma, vertebrobasilar insufficiency, cerebellar stroke, encephalitis, 

hypovolemic, arrhythmia, coronary artery syndrome, anemia, this is not meant to 

be an all-inclusive list





EKG interpreted by me (3pts min.).


@ -As above


X-rays interpreted by me (1pt min.).


@ -Chest x-ray shows no acute dramality


CT interpreted by me (1pt min.).


@ -CT of the brain shows no acute normality


U/S interpreted by me (1pt. min.).


@ -None done


What testing was considered but not performed or refused? (CT, X-rays, U/S, 

labs)? Why?


@ -None


What meds were considered but not given or refused? Why?


@ -None


Did you discuss the management of the patient with other professionals 

(professionals i.e. , PA, NP, lab, RT, psych nurse, , , 

teacher, , )? Give summary


@ -No


Was smoking cessation discussed for >3mins.?


@ -No


Was critical care preformed (if so, how long)?


@ -No


Were there social determinants of health that impacted care today? How? 

(Homelessness, low income, unemployed, alcoholism, drug addiction, 

transportation, low edu. Level, literacy, decrease access to med. care, FDC, 

rehab)?


@ -No


Was there de-escalation of care discussed even if they declined (Discuss DNR or 

withdrawal of care, Hospice)? DNR status


@ -No


What co-morbidities impacted this encounter? (DM, HTN, Smoking, COPD, CAD, 

Cancer, CVA, ARF, Chemo, Hep., AIDS, mental health diagnosis, sleep apnea, 

morbid obesity)?


@ -None


Was patient admitted / discharged? Hospital course, mention meds given and 

route, prescriptions, significant lab abnormalities, going to OR and other 

pertinent info.


@ -Patient received Antivert in the emergency department and did not feel as 

though it helped very much.  Patient also received IV fluids and then Valium and

 Reglan and she was feeling a little better and felt as though she could go 

home.


Undiagnosed new problem with uncertain prognosis?


@ -No


Drug Therapy requiring intensive monitoring for toxicity (Heparin, Nitro, 

Insulin, Cardizem)?


@ -No


Were any procedures done?


@ -No


Diagnosis/symptom?


@ -Vertigo


Acute, or Chronic, or Acute on Chronic?


@ -Acute


Uncomplicated (without systemic symptoms) or Complicated (systemic symptoms)?


@ -Complicated


Side effects of treatment?


@ -No


Exacerbation, Progression, or Severe Exacerbation?


@ -No


Poses a threat to life or bodily function? How? (Chest pain, USA, MI, pneumonia,

 PE, COPD, DKA, ARF, appy, cholecystitis, CVA, Diverticulitis, Homicidal, 

Suicidal, threat to staff... and all critical care pts)


@ -No





- Lab Data


Result diagrams: 


                                 25 11:00





                                 25 11:00


                                   Lab Results











  25 Range/Units





  11:00 11:00 11:00 


 


WBC  6.2    (3.8-10.6)  k/uL


 


RBC  4.61    (3.80-5.40)  m/uL


 


Hgb  14.0    (11.4-16.0)  gm/dL


 


Hct  44.9    (34.0-46.0)  %


 


MCV  97.5    (80.0-100.0)  fL


 


MCH  30.4    (25.0-35.0)  pg


 


MCHC  31.2    (31.0-37.0)  g/dL


 


RDW  13.1    (11.5-15.5)  %


 


Plt Count  213    (150-450)  k/uL


 


MPV  8.4    


 


Neutrophils %  63    %


 


Lymphocytes %  28    %


 


Monocytes %  4    %


 


Eosinophils %  1    %


 


Basophils %  1    %


 


Neutrophils #  3.9    (1.3-7.7)  k/uL


 


Lymphocytes #  1.8    (1.0-4.8)  k/uL


 


Monocytes #  0.3    (0-1.0)  k/uL


 


Eosinophils #  0.1    (0-0.7)  k/uL


 


Basophils #  0.0    (0-0.2)  k/uL


 


PT   10.9   (10.0-12.5)  sec


 


INR   1.0   (<1.2)  


 


APTT   22.1   (22.0-30.0)  sec


 


Sodium    138  (137-145)  mmol/L


 


Potassium    3.4 L  (3.5-5.1)  mmol/L


 


Chloride    104  ()  mmol/L


 


Carbon Dioxide    20 L  (22-30)  mmol/L


 


Anion Gap    14  mmol/L


 


BUN    14  (7-17)  mg/dL


 


Creatinine    0.64  (0.52-1.04)  mg/dL


 


Est GFR (CKD-EPI)AfAm    >90  (>60 ml/min/1.73 sqM)  


 


Est GFR (CKD-EPI)NonAf    >90  (>60 ml/min/1.73 sqM)  


 


Glucose    103 H  (74-99)  mg/dL


 


Calcium    9.7  (8.4-10.2)  mg/dL


 


Magnesium    1.8  (1.6-2.3)  mg/dL


 


Total Bilirubin    1.3  (0.2-1.3)  mg/dL


 


AST    29  (14-36)  U/L


 


ALT    21  (4-34)  U/L


 


Alkaline Phosphatase    95  ()  U/L


 


Troponin I     (0.000-0.034)  ng/mL


 


Total Protein    8.2  (6.3-8.2)  g/dL


 


Albumin    4.6  (3.5-5.0)  g/dL














  25 Range/Units





  11:00 


 


WBC   (3.8-10.6)  k/uL


 


RBC   (3.80-5.40)  m/uL


 


Hgb   (11.4-16.0)  gm/dL


 


Hct   (34.0-46.0)  %


 


MCV   (80.0-100.0)  fL


 


MCH   (25.0-35.0)  pg


 


MCHC   (31.0-37.0)  g/dL


 


RDW   (11.5-15.5)  %


 


Plt Count   (150-450)  k/uL


 


MPV   


 


Neutrophils %   %


 


Lymphocytes %   %


 


Monocytes %   %


 


Eosinophils %   %


 


Basophils %   %


 


Neutrophils #   (1.3-7.7)  k/uL


 


Lymphocytes #   (1.0-4.8)  k/uL


 


Monocytes #   (0-1.0)  k/uL


 


Eosinophils #   (0-0.7)  k/uL


 


Basophils #   (0-0.2)  k/uL


 


PT   (10.0-12.5)  sec


 


INR   (<1.2)  


 


APTT   (22.0-30.0)  sec


 


Sodium   (137-145)  mmol/L


 


Potassium   (3.5-5.1)  mmol/L


 


Chloride   ()  mmol/L


 


Carbon Dioxide   (22-30)  mmol/L


 


Anion Gap   mmol/L


 


BUN   (7-17)  mg/dL


 


Creatinine   (0.52-1.04)  mg/dL


 


Est GFR (CKD-EPI)AfAm   (>60 ml/min/1.73 sqM)  


 


Est GFR (CKD-EPI)NonAf   (>60 ml/min/1.73 sqM)  


 


Glucose   (74-99)  mg/dL


 


Calcium   (8.4-10.2)  mg/dL


 


Magnesium   (1.6-2.3)  mg/dL


 


Total Bilirubin   (0.2-1.3)  mg/dL


 


AST   (14-36)  U/L


 


ALT   (4-34)  U/L


 


Alkaline Phosphatase   ()  U/L


 


Troponin I  <0.012  (0.000-0.034)  ng/mL


 


Total Protein   (6.3-8.2)  g/dL


 


Albumin   (3.5-5.0)  g/dL














Disposition


Clinical Impression: 


 Vertigo





Disposition: HOME SELF-CARE


Condition: Good


Prescriptions: 


Meclizine [Antivert] 25 mg PO TID #20 tab


Is patient prescribed a controlled substance at d/c from ED?: No


Referrals: 


None,Stated [Primary Care Provider] - 1-2 days


Time of Disposition: 13:15

## 2025-03-12 NOTE — CT
EXAMINATION TYPE: CT brain wo con

 

DATE OF EXAM: 3/12/2025 11:47 AM

 

COMPARISON: None.

 

CLINICAL INDICATION: Female, 54 years old with history of Ataxia, ATAXIA

 

TECHNIQUE: 

Brain: Axial CT images of the brain were obtained with coronal and sagittal reformats created and rev
iewed.

Contrast used: None.

Oral contrast used: None.

CT DLP: 1157.4 mGycm, Automated exposure control for dose reduction was used.

 

FINDINGS:

 

Brain:

Extra-axial spaces: No abnormal extra-axial fluid collections.

Ventricular system: Within normal limits

Cerebral parenchyma: No acute intraparenchymal hemorrhage or mass effect.  The gray-white junction is
 well differentiated.     

Cerebellum: Unremarkable.

Mass effect: No evidence of midline shift.

Intracranial vasculature: unremarkable

Soft tissues: Normal.

Calvarium/osseous structures: No depressed skull fracture.

Paranasal sinuses and mastoid air cells: Mild scattered paranasal sinus disease.

Visualized orbits: Orbital contents are intact.

 

IMPRESSION:

No acute intracranial process.

 

 

[facility], Workstation: [id number], 3/12/2025 11:57 AM